# Patient Record
Sex: FEMALE | Race: BLACK OR AFRICAN AMERICAN | NOT HISPANIC OR LATINO
[De-identification: names, ages, dates, MRNs, and addresses within clinical notes are randomized per-mention and may not be internally consistent; named-entity substitution may affect disease eponyms.]

---

## 2023-05-30 ENCOUNTER — NON-APPOINTMENT (OUTPATIENT)
Age: 68
End: 2023-05-30

## 2023-05-30 PROBLEM — Z00.00 ENCOUNTER FOR PREVENTIVE HEALTH EXAMINATION: Status: ACTIVE | Noted: 2023-05-30

## 2023-06-05 ENCOUNTER — APPOINTMENT (OUTPATIENT)
Dept: BREAST CENTER | Facility: CLINIC | Age: 68
End: 2023-06-05
Payer: MEDICARE

## 2023-06-05 ENCOUNTER — APPOINTMENT (OUTPATIENT)
Dept: HEMATOLOGY ONCOLOGY | Facility: CLINIC | Age: 68
End: 2023-06-05

## 2023-06-05 VITALS
HEIGHT: 62 IN | DIASTOLIC BLOOD PRESSURE: 75 MMHG | SYSTOLIC BLOOD PRESSURE: 106 MMHG | BODY MASS INDEX: 19.32 KG/M2 | HEART RATE: 102 BPM | WEIGHT: 105 LBS

## 2023-06-05 DIAGNOSIS — Z86.39 PERSONAL HISTORY OF OTHER ENDOCRINE, NUTRITIONAL AND METABOLIC DISEASE: ICD-10-CM

## 2023-06-05 DIAGNOSIS — Z86.79 PERSONAL HISTORY OF OTHER DISEASES OF THE CIRCULATORY SYSTEM: ICD-10-CM

## 2023-06-05 DIAGNOSIS — Z80.41 FAMILY HISTORY OF MALIGNANT NEOPLASM OF OVARY: ICD-10-CM

## 2023-06-05 DIAGNOSIS — Z80.3 FAMILY HISTORY OF MALIGNANT NEOPLASM OF BREAST: ICD-10-CM

## 2023-06-05 PROCEDURE — 99204 OFFICE O/P NEW MOD 45 MIN: CPT

## 2023-06-05 RX ORDER — LEVOTHYROXINE SODIUM 0.17 MG/1
TABLET ORAL
Refills: 0 | Status: ACTIVE | COMMUNITY

## 2023-06-05 RX ORDER — LISINOPRIL 30 MG/1
TABLET ORAL
Refills: 0 | Status: ACTIVE | COMMUNITY

## 2023-06-05 RX ORDER — METFORMIN HYDROCHLORIDE 625 MG/1
TABLET ORAL
Refills: 0 | Status: ACTIVE | COMMUNITY

## 2023-06-05 RX ORDER — EMPAGLIFLOZIN 25 MG/1
TABLET, FILM COATED ORAL
Refills: 0 | Status: ACTIVE | COMMUNITY

## 2023-06-05 RX ORDER — ATORVASTATIN CALCIUM 80 MG/1
TABLET, FILM COATED ORAL
Refills: 0 | Status: ACTIVE | COMMUNITY

## 2023-06-05 NOTE — PAST MEDICAL HISTORY
[Menarche Age ____] : age at menarche was [unfilled] [Menopause Age____] : age at menopause was [unfilled] [Total Preg ___] : G[unfilled] [Live Births ___] : P[unfilled]  [Age At Live Birth ___] : Age at live birth: [unfilled] [History of Hormone Replacement Treatment] : has no history of hormone replacement treatment [FreeTextEntry6] : no [FreeTextEntry7] : no [FreeTextEntry8] : no

## 2023-06-05 NOTE — PHYSICAL EXAM
[Normocephalic] : normocephalic [EOMI] : extra ocular movement intact [Supple] : supple [Examined in the supine and seated position] : examined in the supine and seated position [Symmetrical] : symmetrical [No dominant masses] : no dominant masses in right breast  [No dominant masses] : no dominant masses left breast [No Nipple Retraction] : no left nipple retraction [No Nipple Discharge] : no left nipple discharge [No Axillary Lymphadenopathy] : no left axillary lymphadenopathy [No Rashes] : no rashes [de-identified] : No palpable masses noted even in the area of clinical concern [de-identified] : No palpable masses noted even in the area of clinical concern

## 2023-06-05 NOTE — ASSESSMENT
[FreeTextEntry1] : 67-year-old female with bilateral breast pain and family history of breast and ovarian cancer

## 2023-06-05 NOTE — HISTORY OF PRESENT ILLNESS
[FreeTextEntry1] : 68 yo female, referred by [facility] radiology presents for palpable breast masses and breast pain. Patient complains of intermittent sharp pain in left breast UOQ and right retroareolar region onset 1 year ago, but states the pain in the left breast has become worse over the last few months. Patient was seen by her gynecologist about 2 months ago, patient reports her gynecologist appreciated lumps in both breasts. Patient reports a palpable abnormality of the left breast UOQ about 2 months ago. Denies skin changes/dimpling, no nipple discharge bilaterally.  Last breast imaging performed in December 2022 returned as benign.  Due to the family history described below I recommended genetic counseling referral for possible genetic testing.  The patient is amenable and agrees.\par \par OBEY lifetime risk is 7.8%; sister #1 diagnosed with breast cancer age 66 and sister #2 diagnosed with ovarian cancer age 60. Patient believes her sister diagnosed with breast cancer underwent genetic testing, unsure of the results. \par \par

## 2023-06-05 NOTE — DATA REVIEWED
[FreeTextEntry1] : 12/28/22 ([facility]) b/l screening mammogram and ultrasound: heterogenously dense. No findings suspicious for malignancy. BI-RADS 2.

## 2023-06-06 NOTE — DISCUSSION/SUMMARY
[FreeTextEntry1] : REASON FOR CONSULT\par Lali Fuentes is a 67-year-old female who was referred by Dr. Anisa Acosta for cancer genetic counseling and risk assessment due to a family history of ovarian cancer and breast cancer. \par \par RELEVANT MEDICAL HISTORY\par Ms. Fuentes is a healthy individual who has never had cancer. She has a family history of cancer, see below.\par \par OTHER MEDICAL AND SURGICAL HISTORY:\par •	Medical History: hyperlipidemia, hypertension, diabetes, GERD\par •	Surgical History:  section (x2), cholecystectomy, hemithyroidectomy (due to cysts and nodules; 2022), patient reports a possible hemicolectomy due to gastrointestinal symptoms, but she is unsure of the exact procedure\par \par PAST OB/GYN HISTORY:\par Obstetrical History: \par Age at Menarche: 14\par Menopausal with LMP at age 40\par Age at First Live Birth: 37\par Oral Contraceptive Use: Yes, less than 1 year in total\par Hormone Replacement Therapy: No\par \par CANCER SCREENING HISTORY:  \par Breast: \par •	Mammography: last 2022, normal \par •	Sonography: last 2022, normal\par •	MRI: No\par •	Biopsies: No\par GYN:\par •	Pelvic Examination: last , reportedly normal \par •	Sonography: No\par •	CA-125: No\par Colon:\par •	Colonoscopy: last prior to , reportedly normal. Patient reports that she is unsure when she was recommended to return for her next colonoscopy. Patient reports that she is unsure if colorectal polyps were identified on previous colonoscopies. \par •	Upper Endoscopy: last  due to her history of GERD, reportedly normal \par Skin:  \par •	FBSE: No\par •	Lesions biopsied/removed: No\par \par SOCIAL HISTORY:\par •	Single\par •	Tobacco-product use: No\par \par FAMILY HISTORY:\par Maternal ancestry and paternal ancestry were reported as Italian. Ashkenazi Taoism ancestry and consanguinity were denied. A detailed family history of cancer was ascertained. Relevant diagnoses are detailed below and in the scanned pedigree. \par \par To Ms. Fuentes’s knowledge, no one in the family has had germline testing for cancer susceptibility.  \par 	\par 	RISK ASSESSMENT:\par Ms. Fuentes’s family history of breast cancer, ovarian cancer, and prostate cancer is suggestive of an inherited predisposition to breast cancer and ovarian cancer and related cancers. We recommended genetic testing for genes associated with breast cancer and gynecological cancers. However, Ms. Fuentes does not meet Medicare’s criteria for genetic testing at this time. We discussed the option of pursuing genetic testing using the self-pay option offered at [facility] ($250). \par \par We discussed the risks, benefits and limitations, and implications of genetic testing. We also discussed the psychosocial implications of genetic testing. Possible test results were reviewed with Ms. Fuentes, along with associated medical management options. The Genetic Information Non-discrimination Act ([place]ISE) was also reviewed. \par \par Following our discussion, Ms. Fuentes stated that she would like to think further about pursuing genetic testing given the out-of-pocket cost. She will reach out to [facility] if she would like to proceed with the recommended genetic test. She was made aware that we remain available to her at any point in the future if she would like to pursue genetic testing. \par \par PLAN:\par \par 1.	Ms. Fuentes declined testing today. She will reach out to [facility] if she would like to pursue genetic testing in the future.\par \par For any additional questions please call [facility] at [phone]. \par \par \par Kaye Holcomb MS, [facility] [place]\par Genetic Counselor, [facility]\par \par \par CC: \par Dr. Anisa Acosta\par

## 2023-06-12 ENCOUNTER — NON-APPOINTMENT (OUTPATIENT)
Age: 68
End: 2023-06-12

## 2023-12-11 ENCOUNTER — APPOINTMENT (OUTPATIENT)
Dept: BREAST CENTER | Facility: CLINIC | Age: 68
End: 2023-12-11

## 2024-06-17 ENCOUNTER — APPOINTMENT (OUTPATIENT)
Dept: BREAST CENTER | Facility: CLINIC | Age: 69
End: 2024-06-17
Payer: MEDICARE

## 2024-06-17 VITALS
WEIGHT: 110 LBS | DIASTOLIC BLOOD PRESSURE: 85 MMHG | SYSTOLIC BLOOD PRESSURE: 133 MMHG | HEART RATE: 80 BPM | HEIGHT: 62 IN | BODY MASS INDEX: 20.24 KG/M2

## 2024-06-17 DIAGNOSIS — N64.59 OTHER SIGNS AND SYMPTOMS IN BREAST: ICD-10-CM

## 2024-06-17 DIAGNOSIS — R92.30 DENSE BREASTS, UNSPECIFIED: ICD-10-CM

## 2024-06-17 DIAGNOSIS — N64.4 MASTODYNIA: ICD-10-CM

## 2024-06-17 PROCEDURE — 99213 OFFICE O/P EST LOW 20 MIN: CPT

## 2024-06-17 RX ORDER — PNV NO.95/FERROUS FUM/FOLIC AC 28MG-0.8MG
TABLET ORAL
Refills: 0 | Status: ACTIVE | COMMUNITY

## 2024-06-17 RX ORDER — IBUPROFEN 200 MG
600 CAPSULE ORAL
Refills: 0 | Status: ACTIVE | COMMUNITY

## 2024-06-17 RX ORDER — MELOXICAM 15 MG/1
15 TABLET ORAL
Refills: 0 | Status: ACTIVE | COMMUNITY

## 2024-06-17 RX ORDER — ALENDRONATE SODIUM 70 MG/1
70 TABLET ORAL
Refills: 0 | Status: ACTIVE | COMMUNITY

## 2024-06-17 NOTE — DATA REVIEWED
[FreeTextEntry1] : 12/28/22 ([facility]) b/l screening mammogram and ultrasound: heterogenously dense. No findings suspicious for malignancy. BI-RADS 2.   6/12/23 ([facility]) b/l dx mammogram/US: heterogeneously dense, left 2:00 5cmFN echogenic calcifications, benign appearing. No mammographic or sonographic evidence of malignancy. Benign BIRADS 2.   6/17/24 ([facility]) b/l diag mammo and US: heterogeneously dense. No evidence of malignancy. BI-RADS 2.

## 2024-06-17 NOTE — HISTORY OF PRESENT ILLNESS
[FreeTextEntry1] : 69 yo female, referred by [facility] radiology presents for palpable breast masses and breast pain. Patient complains of intermittent sharp pain in left breast UOQ and right retroareolar region onset 2 years ago. Patient was seen by her gynecologist last year, patient reports her gynecologist appreciated lumps in both breasts at that time. Denies skin changes/dimpling, no nipple discharge bilaterally.  The patient underwent bilateral diagnostic mammogram and sonogram today which returned as benign.  The patient's breast imaging last year was also benign.  I explained to the patient that the breast pain is likely benign in nature.  I did discuss the importance of genetic testing.  The patient has changed her mind and stated that she is now amenable.  We will reconsult genetic counseling.  OBEY lifetime risk is 7.8%; sister #1 diagnosed with breast cancer age 66 and sister #2 diagnosed with ovarian cancer age 60. Patient believes her sister diagnosed with breast cancer underwent genetic testing, unsure of the results. The patient met with the genetic counselors last year, and ultimately decided to decline testing.

## 2024-06-17 NOTE — PLAN
[TextEntry] : Bilateral screening mammogram and sonogram due in June 2025 Patient to perform self-breast exams as instructed in the office. Patient to follow-up as needed as long as she is following with a primary care provider or GYN for annual clinical breast exam as well as breast imaging

## 2024-06-17 NOTE — PHYSICAL EXAM
[Normocephalic] : normocephalic [EOMI] : extra ocular movement intact [Supple] : supple [Examined in the supine and seated position] : examined in the supine and seated position [Symmetrical] : symmetrical [No dominant masses] : no dominant masses in right breast  [No dominant masses] : no dominant masses left breast [No Nipple Retraction] : no left nipple retraction [No Nipple Discharge] : no left nipple discharge [No Axillary Lymphadenopathy] : no left axillary lymphadenopathy [No Rashes] : no rashes [No Supraclavicular Adenopathy] : no supraclavicular adenopathy [de-identified] : No palpable masses noted even in the area of clinical concern [de-identified] : No palpable masses noted even in the area of clinical concern

## 2024-07-23 ENCOUNTER — APPOINTMENT (OUTPATIENT)
Dept: HEMATOLOGY ONCOLOGY | Facility: CLINIC | Age: 69
End: 2024-07-23

## 2024-07-23 NOTE — DISCUSSION/SUMMARY
[FreeTextEntry1] : Ms. Fuentes presented to our office today to submit a blood sample for genetic testing. She previously had a full counseling session in 2023 with genetic counselor, Kaye Holcomb, and declined testing at that time. She stated she would like to proceed now and all questions and concerns were addressed. Blood was drawn today and sample was sent to [facility].   PLAN: 1. Blood drawn today will be sent to [facility] for analysis. 2. We will contact Ms. Fuentes to schedule a follow-up appointment once the results are available. Results generally return in 2-3 weeks.  For any additional questions please call [facility] at [phone].  Geno Florentino MS, [facility] [place], [place] Genetic Counselor, [facility].

## 2024-07-30 ENCOUNTER — NON-APPOINTMENT (OUTPATIENT)
Age: 69
End: 2024-07-30

## 2024-07-30 NOTE — DISCUSSION/SUMMARY
[FreeTextEntry1] : RESULTS TRANSMISSION Lali Fuentes is a 69-year-old female who was called on 07/30/2024 for a discussion regarding their genetic testing results related to hereditary cancer predisposition.   Ms. Fuentes was originally seen at [facility] on 06/05/2023 for hereditary cancer predisposition risk assessment due to a family history of cancer. At this appointment, she deferred genetic testing. She returned to [facility] on 07/23/2024 and met with genetic counselor, Geno Florentino. At this appointment, Ms. Fuentes decided to pursue genetic testing using the [facility]CANext panel offered at [facility].  INTERVAL HISTORY Ms. Fuentes provided updated family history at her appointment on 07/23/2024. Please see below and scanned chart for pedigree.  TEST RESULTS: NEGATIVE  No pathogenic (disease-causing) variants or VUSs were detected in the following genes:  MIRIAM, BARD1, BRCA1, BRCA2, BRIP1, CDH1, CHEK2, EPCAM, MLH1, MSH2, MSH6, NF1, PALB2, PMS2, PTEN, RAD51C, RAD51D, STK11, and TP53.  RESULTS INTERPRETATION AND ASSESSMENT: Given Ms. Fuentes's personal medical history and current reported family history of cancer, and her negative genetic test results, the following screening guidelines and risk-reducing recommendations were discussed:  OTHER:  - In the absence of other indications, Ms. Fuentes should practice age-appropriate cancer screening of other organ systems as recommended for the general population.  We also discussed that, while no cause of the patient's personal and family history of cancer was identified, this result, while reassuring, does entirely not rule out a hereditary cancer risk in the patient. It is possible, although unlikely, the patient has a mutation in one of the genes tested that is not detectable by this analysis, or has a mutation in a different gene, either known or unknown. It is also possible there is a hereditary cancer predisposition in the family, but the patient did not inherit it.  We informed Ms. Fuentes that our knowledge of genetics and inherited cancer conditions is changing rapidly. Therefore, we recommended that Ms. Fuentes contact our office, every 2 to 3 years, to discuss relevant advances in cancer genetics.  We emphasized the importance of re-contacting us with updates regarding her personal and family history of cancer as well as any updates regarding additional cancer genetic test results performed for the patient and/or family members.  Such updates could possibly change our risk assessment and recommendations.   In addition, we discussed Ms. Fuentes's sisters and brothers who have been diagnosed with cancer should consider pursuing cancer risk assessment genetic counseling with the option of genetic testing.   PLAN: 1.See above for recommended screening and risk-reduction strategies. 2. Patient informed consult note(s) will be available through their [email or web address] patient portal and genetic test results will be released via [facility] laboratory portal.  3. Ms. Fuentes was encouraged to contact us every 2-3 years to discuss relevant advances in cancer genetics, or sooner if there are any changes in her personal or family history of cancer.   For any additional questions please call [facility] at [phone].    Kaye Holcomb MS, [facility] Genetic Counselor, [facility]   CC:  Patient Dr. Anisa Acosta